# Patient Record
Sex: FEMALE | Race: WHITE | NOT HISPANIC OR LATINO | Employment: FULL TIME | URBAN - METROPOLITAN AREA
[De-identification: names, ages, dates, MRNs, and addresses within clinical notes are randomized per-mention and may not be internally consistent; named-entity substitution may affect disease eponyms.]

---
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
## 2023-09-07 PROBLEM — K59.00 CONSTIPATION: Status: ACTIVE | Noted: 2023-09-07

## 2023-09-07 PROBLEM — N20.0 NEPHROLITHIASIS: Status: ACTIVE | Noted: 2023-09-07

## 2023-09-07 PROBLEM — R10.2 SUPRAPUBIC PAIN: Status: ACTIVE | Noted: 2023-09-07

## 2023-09-07 PROBLEM — R11.0 NAUSEA: Status: ACTIVE | Noted: 2023-09-07

## 2023-09-07 PROBLEM — R33.9 URINARY RETENTION: Status: ACTIVE | Noted: 2023-09-07

## 2023-09-07 PROBLEM — Z34.90 SUPERVISION OF NORMAL PREGNANCY: Status: ACTIVE | Noted: 2023-09-07

## 2023-09-08 PROBLEM — Z87.442 HISTORY OF NEPHROLITHIASIS: Status: ACTIVE | Noted: 2023-09-07

## 2023-09-16 PROBLEM — O20.9 VAGINAL BLEEDING IN PREGNANCY, FIRST TRIMESTER: Status: ACTIVE | Noted: 2023-09-16

## 2023-09-28 ENCOUNTER — SOCIAL WORK (OUTPATIENT)
Dept: ADMINISTRATIVE | Facility: OTHER | Age: 18
End: 2023-09-28

## 2023-09-28 NOTE — PROGRESS NOTES
Late4 Entry SW completed pt's notification of pregnancy and faxed/scanned into epic. No other needs identified at this time.     AMANDA Ramos  Desk:[phone]  Fax:[phone]

## 2023-10-11 PROBLEM — N93.9 VAGINAL BLEEDING: Status: ACTIVE | Noted: 2023-10-11

## 2023-10-23 PROBLEM — Z28.21 INFLUENZA VACCINATION DECLINED: Status: ACTIVE | Noted: 2023-10-23

## 2023-10-27 ENCOUNTER — PATIENT MESSAGE (OUTPATIENT)
Dept: ADMINISTRATIVE | Facility: OTHER | Age: 18
End: 2023-10-27

## 2023-11-03 ENCOUNTER — PATIENT MESSAGE (OUTPATIENT)
Dept: ADMINISTRATIVE | Facility: OTHER | Age: 18
End: 2023-11-03

## 2023-11-07 PROBLEM — R51.9 HEADACHE IN PREGNANCY, ANTEPARTUM, SECOND TRIMESTER: Status: ACTIVE | Noted: 2023-11-07

## 2023-11-07 PROBLEM — O26.892 HEADACHE IN PREGNANCY, ANTEPARTUM, SECOND TRIMESTER: Status: ACTIVE | Noted: 2023-11-07

## 2023-11-07 PROBLEM — J45.20 MILD INTERMITTENT ASTHMA WITHOUT COMPLICATION: Status: ACTIVE | Noted: 2023-11-07

## 2024-01-17 PROBLEM — R03.0 ELEVATED BLOOD PRESSURE READING: Status: ACTIVE | Noted: 2024-01-17

## 2024-03-03 PROBLEM — O99.613 CONSTIPATION DURING PREGNANCY IN THIRD TRIMESTER: Status: ACTIVE | Noted: 2023-09-07

## 2024-03-03 PROBLEM — R30.0 DYSURIA DURING PREGNANCY IN THIRD TRIMESTER: Status: ACTIVE | Noted: 2024-03-03

## 2024-03-03 PROBLEM — O26.893 DYSURIA DURING PREGNANCY IN THIRD TRIMESTER: Status: ACTIVE | Noted: 2024-03-03

## 2024-03-11 PROBLEM — R39.89 URETHRAL PAIN: Status: ACTIVE | Noted: 2024-03-11

## 2024-04-02 PROBLEM — O13.3 GESTATIONAL HYPERTENSION, THIRD TRIMESTER: Status: ACTIVE | Noted: 2024-04-02

## 2024-04-09 PROBLEM — O09.93 SUPERVISION OF HIGH RISK PREGNANCY IN THIRD TRIMESTER: Status: ACTIVE | Noted: 2024-04-09

## 2024-04-16 PROBLEM — N93.9 VAGINAL BLEEDING: Status: RESOLVED | Noted: 2023-10-11 | Resolved: 2024-04-16

## 2024-04-16 PROBLEM — R11.0 NAUSEA: Status: RESOLVED | Noted: 2023-09-07 | Resolved: 2024-04-16

## 2024-04-30 PROBLEM — R00.2 PALPITATIONS: Status: ACTIVE | Noted: 2024-04-30

## 2024-06-03 PROBLEM — N93.9 ABNORMAL VAGINAL BLEEDING: Status: ACTIVE | Noted: 2024-06-03

## 2024-06-03 LAB — PAP RECOMMENDATION EXT: NORMAL

## 2024-07-22 PROBLEM — O13.3 GESTATIONAL HYPERTENSION, THIRD TRIMESTER: Status: RESOLVED | Noted: 2024-04-02 | Resolved: 2024-07-22

## 2024-09-04 ENCOUNTER — PATIENT OUTREACH (OUTPATIENT)
Dept: ADMINISTRATIVE | Facility: HOSPITAL | Age: 19
End: 2024-09-04